# Patient Record
Sex: FEMALE | Race: WHITE
[De-identification: names, ages, dates, MRNs, and addresses within clinical notes are randomized per-mention and may not be internally consistent; named-entity substitution may affect disease eponyms.]

---

## 2017-12-12 ENCOUNTER — HOSPITAL ENCOUNTER (INPATIENT)
Dept: HOSPITAL 11 - JP.SDS | Age: 32
LOS: 3 days | Discharge: HOME | DRG: 621 | End: 2017-12-15
Attending: SURGERY | Admitting: SURGERY
Payer: COMMERCIAL

## 2017-12-12 DIAGNOSIS — F32.9: ICD-10-CM

## 2017-12-12 DIAGNOSIS — E03.9: ICD-10-CM

## 2017-12-12 DIAGNOSIS — K31.89: ICD-10-CM

## 2017-12-12 DIAGNOSIS — E11.9: ICD-10-CM

## 2017-12-12 DIAGNOSIS — F34.1: ICD-10-CM

## 2017-12-12 DIAGNOSIS — R16.0: ICD-10-CM

## 2017-12-12 DIAGNOSIS — R13.10: ICD-10-CM

## 2017-12-12 DIAGNOSIS — E66.01: Primary | ICD-10-CM

## 2017-12-12 DIAGNOSIS — K92.89: ICD-10-CM

## 2017-12-12 DIAGNOSIS — F43.10: ICD-10-CM

## 2017-12-12 DIAGNOSIS — G89.29: ICD-10-CM

## 2017-12-12 DIAGNOSIS — J45.20: ICD-10-CM

## 2017-12-12 DIAGNOSIS — Z88.8: ICD-10-CM

## 2017-12-12 DIAGNOSIS — Z88.0: ICD-10-CM

## 2017-12-12 DIAGNOSIS — Z86.14: ICD-10-CM

## 2017-12-12 DIAGNOSIS — M54.2: ICD-10-CM

## 2017-12-12 DIAGNOSIS — Z79.891: ICD-10-CM

## 2017-12-12 DIAGNOSIS — I10: ICD-10-CM

## 2017-12-12 DIAGNOSIS — Z88.2: ICD-10-CM

## 2017-12-12 PROCEDURE — C9113 INJ PANTOPRAZOLE SODIUM, VIA: HCPCS

## 2017-12-12 PROCEDURE — 0DBU4ZX EXCISION OF OMENTUM, PERCUTANEOUS ENDOSCOPIC APPROACH, DIAGNOSTIC: ICD-10-PCS | Performed by: SURGERY

## 2017-12-12 PROCEDURE — 0DB64ZX EXCISION OF STOMACH, PERCUTANEOUS ENDOSCOPIC APPROACH, DIAGNOSTIC: ICD-10-PCS | Performed by: SURGERY

## 2017-12-12 PROCEDURE — 0D164ZA BYPASS STOMACH TO JEJUNUM, PERCUTANEOUS ENDOSCOPIC APPROACH: ICD-10-PCS | Performed by: SURGERY

## 2017-12-12 PROCEDURE — 0FB04ZX EXCISION OF LIVER, PERCUTANEOUS ENDOSCOPIC APPROACH, DIAGNOSTIC: ICD-10-PCS | Performed by: SURGERY

## 2017-12-12 PROCEDURE — 3E0T3BZ INTRODUCTION OF ANESTHETIC AGENT INTO PERIPHERAL NERVES AND PLEXI, PERCUTANEOUS APPROACH: ICD-10-PCS | Performed by: SURGERY

## 2017-12-12 RX ADMIN — SODIUM CHLORIDE, SODIUM LACTATE, POTASSIUM CHLORIDE, AND CALCIUM CHLORIDE SCH MLS/HR: .6; .31; .03; .02 INJECTION, SOLUTION INTRAVENOUS at 16:07

## 2017-12-12 RX ADMIN — ACETAMINOPHEN SCH MG: 650 SOLUTION ORAL at 14:47

## 2017-12-12 RX ADMIN — LORAZEPAM PRN MG: 2 INJECTION INTRAMUSCULAR; INTRAVENOUS at 22:14

## 2017-12-12 RX ADMIN — ACETAMINOPHEN SCH: 650 SOLUTION ORAL at 21:10

## 2017-12-13 RX ADMIN — LORAZEPAM PRN MG: 2 INJECTION INTRAMUSCULAR; INTRAVENOUS at 02:25

## 2017-12-13 RX ADMIN — LORAZEPAM PRN MG: 2 INJECTION INTRAMUSCULAR; INTRAVENOUS at 19:47

## 2017-12-13 RX ADMIN — ACETAMINOPHEN PRN MLS/HR: 10 INJECTION, SOLUTION INTRAVENOUS at 10:40

## 2017-12-13 RX ADMIN — LORAZEPAM PRN MG: 2 INJECTION INTRAMUSCULAR; INTRAVENOUS at 22:25

## 2017-12-13 RX ADMIN — LORAZEPAM PRN MG: 2 INJECTION INTRAMUSCULAR; INTRAVENOUS at 04:45

## 2017-12-13 RX ADMIN — SODIUM CHLORIDE, SODIUM LACTATE, POTASSIUM CHLORIDE, AND CALCIUM CHLORIDE SCH MLS/HR: .6; .31; .03; .02 INJECTION, SOLUTION INTRAVENOUS at 15:45

## 2017-12-13 RX ADMIN — Medication SCH: at 10:41

## 2017-12-13 RX ADMIN — ACETAMINOPHEN SCH: 650 SOLUTION ORAL at 14:44

## 2017-12-13 RX ADMIN — ACETAMINOPHEN SCH MG: 650 SOLUTION ORAL at 02:21

## 2017-12-13 RX ADMIN — ACETAMINOPHEN PRN MLS/HR: 10 INJECTION, SOLUTION INTRAVENOUS at 19:57

## 2017-12-13 NOTE — PN
DATE OF SERVICE:  12/13/2017

 

SUBJECTIVE:  Codi has been having some difficulty swallowing.  Her upper GI did show that

she was tight but it is emptying.  Her lidocaine was discontinued during the night.

 

REVIEW OF SYSTEMS:  Remainder of review of systems negative for any pertinent positives and

negatives.

 

OBJECTIVE:  GENERAL:  Codi Piña is a 32-year-old female.

VITAL SIGNS:  TPR is 98.9, 94, 18, blood pressure 140/89.

HEENT:  Negative.

NECK:  Supple.

HEART:  Regular rate and rhythm.

LUNGS:  Clear.

ABDOMEN:  Dressings dry and intact.  Abdominal binder is on.  YAMEL drain has put out 85 mL of

a light pink drainage.

EXTREMITIES:  Without peripheral edema.

 

ASSESSMENT:  Laparoscopic Killian-en-Y gastric bypass surgery.

 

PLAN:

1. Sips of clear liquids.

2. Decrease IV to 100 mL per hour.

3. Change acetaminophen to 1000 mg IV q.6 hours today.

4. Start albuterol inhaler 2 puffs q.4 hours p.r.n. shortness of breath.

5. Albuterol ipratropium 3 mL inhaled as directed every 6 hours p.r.n. as she takes at

    home.

6. Flexeril 10 mg t.i.d. p.r.n. muscle spasms.

7. Lexapro 20 mg p.o. daily.

8. Hydrochlorothiazide 25 mg p.o. daily.

9. Discontinue Celebrex today and give Toradol 60 mg IM 1 time.  She is unable to swallow

    pills.  The other pills they are nursing staff, either open or crush.

10.Good pulmonary toilet.

11.We will evaluate p.r.n. or in a.m.

 

 

 

 

Adela Mack PA-C

DD:  12/13/2017 08:07:12

DT:  12/13/2017 08:35:19

Job #:  [id number]

## 2017-12-13 NOTE — CR
UGI wo KUB

 

HISTORY: eval R -Y GBP 

 

FINDINGS: After administration of oral contrast, upright views were obtained. Post operative changes 
gastric bypass. Surgical drains in place. No evidence for leak. Contrast passes freely into proximal 
small bowel loops.

 

IMPRESSION: No evidence for leak or obstruction.

## 2017-12-14 RX ADMIN — Medication SCH: at 09:32

## 2017-12-14 RX ADMIN — LORAZEPAM PRN MG: 2 INJECTION INTRAMUSCULAR; INTRAVENOUS at 03:53

## 2017-12-14 RX ADMIN — LORAZEPAM PRN MG: 2 INJECTION INTRAMUSCULAR; INTRAVENOUS at 22:16

## 2017-12-14 RX ADMIN — ACETAMINOPHEN PRN MLS/HR: 10 INJECTION, SOLUTION INTRAVENOUS at 03:55

## 2017-12-14 NOTE — PN
DATE OF SERVICE:  12/14/2017

 

SUBJECTIVE:  Codi is postop day #2.  She is able to swallow a little bit better today.

Within the past 24 hours, she did have 540 in.  She feels like it is starting to open up a

little bit.

 

REVIEW OF SYSTEMS:  Remainder of review of systems negative for any pertinent positives and

negatives.

 

OBJECTIVE:  GENERAL:  Codi Piña is a 32-year-old female.  She is lying in bed.  She is

more talkative today, feeling better.

VITAL SIGNS:  TPR is 98.6, 70, 17, blood pressure 153/96.  HEENT:  Negative.

NECK:  Supple.

HEART:  Regular rate and rhythm.

LUNGS:  Clear.

ABDOMEN:  Dressings dry and intact.  Abdominal binder is on.  YAMEL drain intact, draining a

pink serosanguineous drainage.

EXTREMITIES:  Without peripheral edema.

 

ASSESSMENT:  Laparoscopic Killian-en-Y gastric bypass surgery.

 

PLAN:

1. Step-1 diet start gradually.

2. Rx Solu-Medrol 80 mg IV q.12 hours today, give 3 med cups to try to drink 1 q.20

    minutes or 3 per hour.

3. Dressing off, may shower.

4. We will evaluate p.r.n. or in a.m.

 

 

 

 

Adela Mack PA-C

DD:  12/14/2017 08:10:03

DT:  12/14/2017 08:20:18

Job #:  [id number]

## 2017-12-18 NOTE — DISCH
ADMISSION DIAGNOSES:

1. Morbid obesity with body mass index of 40.6.

2. Post-traumatic stress disorder.

3. Hypothyroidism.

4. Mild intermittent asthma, "treatment agreement.".

5. Chronic pain medication.

6. Chronic neck pain.

7. Recurrent major depression disorder.

8. Dysthymia.

 

DISCHARGE DIAGNOSIS:  Laparoscopic Killian-en-Y gastric bypass surgery.

 

HISTORY:  Codi Piña is a 32-year-old female with longstanding history of morbid obesity

and increasing comorbidities.  After preoperative evaluation and discussion of possible

risks and benefits, she wished to proceed with surgical procedure.

 

HOSPITAL COURSE:  Codi had her surgery on 12/12/2017.  She had no operative complications.

On postoperative day #1, she had some dysphagia, with difficulty swallowing.  On 12/14/2017,

she was started on Solu-Medrol 80 mg IV, and after the first dose, she was able to start on

a step-1 gastric bypass diet, and her oral intake was 1280.  She was up ambulating.  Vital

signs were stable.  She has received adequate dietary instruction, and she was ready to be

discharged to home on postoperative day #3, on 12/15/2017.

 

PHYSICAL EXAMINATION:

GENERAL:  Codi Piña is a 32-year-old female.

VITAL SIGNS:  Height is 5 feet 7 inches and weight is 259 pounds.  TPR with temperature of

97.9, pulse of 103, respirations of 16; and blood pressure of 142/83.

HEENT:  Negative.

NECK:  Supple.

HEART:  Regular rate and rhythm.

LUNGS:  Clear.

ABDOMEN:  Sutures look good.  4x4's over YMAEL drain site.  Abdominal binder is on.

EXTREMITIES:  Without peripheral edema.

 

DISPOSITION:  Discharged to home.

 

CONDITION:  Stable and improving.

 

FOLLOWUP APPOINTMENT:  On 12/19/2017 at 9:15 a.m. at [place], [place].

 

HOME MEDICATIONS:

1. Zofran ODT 4 mg every four hours p.r.n. nausea.

2. Medrol 4 mg Dosepak, take as directed.

3. She is to resume her home medication of Celebrex 200 mg daily for 14 days.

4. Alprazolam 0.5 mg oral daily p.r.n. anxiety.

5. ProAir inhaler two puffs inhalation every six hours p.r.n. shortness of breath.

6. Flexeril 10 mg three times a day p.r.n. muscle spasms.

7. Lexapro 20 mg oral daily.

8. Hydrochlorothiazide 25 mg oral daily.

9. Hydrocodone/acetaminophen 10/325 one tablet every 8 hours p.r.n. pain.

10.Synthroid 100 mcg oral daily.

11.Depo-Provera continue as scheduled.

12.Discontinue taking the meloxicam.

 

DIET AFTER DISCHARGE:  Step-1 gastric bypass diet with protein shakes only.  Drink 64 ounces

of water daily.

 

ACTIVITY:  No lifting greater than 10 pounds for two weeks.  Walk at least six times daily

inside your house.  Driving, do not drive for one week or while on pain medication.

Shower/bathing, she may shower.

 

WOUND CARE:  Keep operative site clean and dry.  Wound incision care, wear abdominal binder

for two weeks, and then as tolerated.

 

DISCHARGE INSTRUCTIONS:  Notify provider if any fever, increased pain, drainage, nausea,

vomiting, and use incentive spirometer 10 times every hour while awake for two weeks.

## 2017-12-18 NOTE — OR
DATE OF PROCEDURE:  12/12/2017

 

PREOPERATIVE DIAGNOSIS:  Morbid obesity.

 

POSTOPERATIVE DIAGNOSES:

1. Morbid obesity.

2. Marked hepatomegaly.

3. Peritoneal nodules over surface of omentum and lesser curvature of stomach.

 

OPERATIVE PROCEDURE:

1. Laparoscopic Killian-en-Y gastric bypass along with gastroenterostomy (14413).

2. Christopher-Cut needle liver biopsy (77498).

3. Excision of peritoneal nodule overlying omentum (13996).

4. Excision of a peritoneal nodule over lesser curvature of the stomach (47085).

 

ANESTHESIA:  General.

 

INDICATIONS FOR PROCEDURE:  A 32-year-old female presenting with longstanding morbid obesity

and increasingly significant comorbidities.  After preoperative evaluation and discussion,

she wished to proceed with a gastric bypass procedure.  Potential risks of the procedure

including bleeding, infection, leaks from the various GI tract closures, problems with bowel

obstruction over time, as well as possibility of cardiopulmonary, septic, or hemorrhagic

complications leading to death, were all discussed, and the patient wishes to proceed.

 

DETAILS OF PROCEDURE:  The patient was taken to the operating room and placed in a supine

position.  After general endotracheal anesthesia was induced, she was converted to a

lithotomy position and orogastric tube, and the abdomen was then prepped and draped.

 

At 15 cm inferior, 5 cm left of xiphoid process, a transverse incision was made and

peritoneal cavity entered under direct vision with an Optiview trocar.  The peritoneal

cavity was inflated to 15 mmHg pressure with CO2 and laparoscope reinserted.  No underlying

trocar insertion site injuries were seen.

 

At this point, bilateral subcostal transversus abdominis plane blocks were placed using

standard solution.  The location of the needle was visually confirmed to be just underneath

the peritoneum within the transversus abdominis muscle.

 

Following this, 5 additional trocars were placed across the upper mid abdomen, and general

exploration was undertaken.  The patient was noted to have a marked hepatomegaly with the

liver volume being roughly 2 or 3 times normal.  Liver grossly fatty infiltrated.

 

During the course of dissection, 2 reddish peritoneal nodules were identified.  These were

uncertain in nature.  One was a nodule over the midportion of the omentum, which measured

around 4 mm.  This was excised using Harmonic scalpel.  A second nodule was then noted

subsequently on the lesser curvature of the stomach, more or less flush with the attachment

with the lesser omentum, which measured around 8 mm in size.  This likewise was excised.

 

At this point, the omentum was divided in the midline up to the level of the transverse

colon.  This allowed identification of the small bowel to the ligament of Treitz.  The small

bowel was then traced out 200 cm distal to that point, was divided transversely with a APURVA

stapler.  The small bowel was then traced out additional 200 cm, where a side-to-side

enteroenterostomy was accomplished with an internal firing of the Endo-APURVA 60 mm stapler.

The common opening was then closed transversely with the same stapler, the angles

anastomosed, and mesenteric defect approximated with some 0 Ethibond stitch, along with

fibrin sealant.  The divided end of the Killian limb was  from the mesentery for a few

centimeters, which allowed an antecolic position of the Killian limb up to the level of the

gastroesophageal junction without tension.

 

The liver was then retracted anteriorly.  The patient was noted not to have any significant

hiatal hernia.  The gastrointestinal balloon catheter was inflated to 15 mL and pulled up

snugly against the EG junction.  Gastric wall over the apex balloon was marked with

electrocautery, and the balloon catheter was deflated and pulled up from the esophagus.  The

lesser omental tissue adjacent to gastric cardia was then incised, allowing dissection

behind the stomach at that level.  The pouch formation was initiated with a transverse

firing of the APURVA stapler at the level of the cauterized jamal on the gastric cardia.  The

pouch was then completed with 2 additional firings of the APURVA stapler up to and through the

angle of His.  Upon completion of the pouch, both staple lines were noted to be intact.

 

The anvil of a 25 mm EEA stapler was attached to a [place] sump type tube.  The latter was

brought down through the mouth and taken out through a small opening in the gastric pouch,

allowing the anvil likewise to be pulled down to within the gastric pouch.  The divided end

of the Killian limb was then opened and main body of the EEA stapler was passed several

centimeters in the lumen of the small bowel, brought up the anvil and united with it, thus

creating the gastrojejunostomy.  The anastomosis was then reinforced with some 3-0 Vicryl

seromuscular stitch, along with fibrin sealant.  A leak test was accomplished with injection

of 120 mL of air in the gastric pouch while submerged with cefoxitin-containing saline

solution.  No leaks were identified.  A single Kostas-Dennison drain was taken out through the

left subcostal trocar site and placed adjacent to the gastrojejunostomy and from there up

into the splenic fossa.  At that point, no further problems noted, trocars were removed, and

the peritoneal cavity was deflated.  The incisions were closed with some 4-0 Vicryl skin

stitch, which was also used to affix the drain.  Dressings were applied.  The patient was

taken to the recovery room in satisfactory condition.  There were no evident complications.

 

 

 

 

Suresh Mata MD

DD:  12/16/2017 18:28:21

DT:  12/17/2017 11:24:04

Job #:  [id number]

## 2018-02-08 ENCOUNTER — HOSPITAL ENCOUNTER (OUTPATIENT)
Dept: HOSPITAL 11 - JP.SDS | Age: 33
Discharge: HOME | End: 2018-02-08
Attending: SURGERY
Payer: COMMERCIAL

## 2018-02-08 DIAGNOSIS — K95.89: Primary | ICD-10-CM

## 2018-02-08 DIAGNOSIS — Z88.8: ICD-10-CM

## 2018-02-08 DIAGNOSIS — Z88.0: ICD-10-CM

## 2018-02-08 DIAGNOSIS — Z88.2: ICD-10-CM

## 2018-02-12 NOTE — OR
DATE OF PROCEDURE:  02/08/2018

 

PREOPERATIVE DIAGNOSIS:  Probable stricture at gastrojejunostomy.

 

POSTOPERATIVE DIAGNOSIS:  Stricture at gastrojejunostomy.

 

OPERATIVE PROCEDURE:  Upper GI endoscopy with dilation of gastrojejunostomy (99880).

 

ANESTHESIA:  IV sedation.

 

INDICATION FOR PROCEDURE:  This is a 32-year-old female status post Killian-en-Y gastric bypass

on 02/12/2017 and she presents now with symptoms suggestive of stricturing at her

gastrojejunostomy.  Plan is to proceed with an upper GI endoscopy with dilation as

indicated.  Potential risks including bleeding and perforation were discussed, and the

patient wishes to proceed.

 

DETAILS OF PROCEDURE:  The patient was taken to the operating room and placed in a left

lateral decubitus position.  IV sedation was administered, after which the upper GI

endoscope was passed orally through the length of the esophagus and into the gastric pouch.

No retained food or fluid was noted.  The patient was noted to have a moderate stricture at

the gastrojejunostomy with estimated diameter of around 8 mm.  A Bard gastrointestinal

balloon catheter was centered across the anastomosis and inflated to 36-Nigerian size.  This

was held in position for 1 minute, after which the balloon catheter was deflated and

withdrawn.  The scope was then easily passed through the anastomosis.  No complications

noted.  The procedure concluded.  The patient was taken to the recovery room in satisfactory

condition.

 

 

 

 

Suresh Mata MD

DD:  02/11/2018 19:34:08

DT:  02/12/2018 17:48:41

Job #:  [id number]

## 2018-03-02 ENCOUNTER — HOSPITAL ENCOUNTER (OUTPATIENT)
Dept: HOSPITAL 11 - JP.SDS | Age: 33
Discharge: HOME | End: 2018-03-02
Attending: SURGERY
Payer: COMMERCIAL

## 2018-03-02 DIAGNOSIS — Z88.0: ICD-10-CM

## 2018-03-02 DIAGNOSIS — R11.2: Primary | ICD-10-CM

## 2018-03-02 DIAGNOSIS — Z88.2: ICD-10-CM

## 2018-03-02 DIAGNOSIS — Z88.8: ICD-10-CM

## 2018-03-02 DIAGNOSIS — Z98.84: ICD-10-CM

## 2018-03-02 DIAGNOSIS — J45.909: ICD-10-CM

## 2018-03-02 DIAGNOSIS — E03.9: ICD-10-CM

## 2018-03-02 PROCEDURE — 43235 EGD DIAGNOSTIC BRUSH WASH: CPT

## 2018-03-02 NOTE — OR
DATE OF PROCEDURE:  03/02/2018

 

PREOPERATIVE DIAGNOSIS:  Nausea and vomiting, status post Killian-en-Y gastric bypass on

December 12, 2017; possible strictured gastrojejunostomy.

 

POSTOPERATIVE DIAGNOSIS:  Nausea and vomiting, status post Killian-en-Y gastric bypass on

December 12, 2017; possible strictured gastrojejunostomy.  No evidence of strictured

gastrojejunostomy.

 

PROCEDURE:  Esophagogastrojejunostomy.

 

ANESTHESIA:  IV anesthesia with monitored anesthesia care.

 

INDICATION:  This 32-year-old white female underwent a Killian-en-Y gastric bypass on December 12, 2017 for morbid obesity.  Almost a month ago, she had to undergo a gastric dilatation

for a stricture at her gastrojejunostomy.  This was manifested by nausea and vomiting.  She

now notes recurrence of the nausea and vomiting, and is referred for a repeat upper

endoscopy with dilatation.  I counseled her for this, and she gave her informed consent to

proceed.

 

DESCRIPTION OF PROCEDURE:  The patient was placed in the left lateral decubitus position.

IV anesthesia was administered by the Anesthesia Service.  Time-out was held.  The flexible

video Olympus upper endoscope was passed through her mouth, down her esophagus, and into her

gastric remnant.  The scope was easily passed through the anastomosis into the jejunum.

There was no evidence of stricture.  It appeared widely patent.  The scope was then

withdrawn examining the mucosa throughout.  The mucosa throughout appeared unremarkable.

The scope was then removed.  She tolerated the procedure well.

 

 

 

 

Lei Lopez MD

DD:  03/02/2018 08:38:21

DT:  03/02/2018 09:14:37

Job #:  [id number]

## 2018-03-07 ENCOUNTER — HOSPITAL ENCOUNTER (OUTPATIENT)
Dept: HOSPITAL 11 - JP.SDS | Age: 33
Discharge: HOME | End: 2018-03-07
Attending: SURGERY
Payer: COMMERCIAL

## 2018-03-07 DIAGNOSIS — Z88.8: ICD-10-CM

## 2018-03-07 DIAGNOSIS — Z88.0: ICD-10-CM

## 2018-03-07 DIAGNOSIS — J45.909: ICD-10-CM

## 2018-03-07 DIAGNOSIS — E66.9: ICD-10-CM

## 2018-03-07 DIAGNOSIS — E03.9: ICD-10-CM

## 2018-03-07 DIAGNOSIS — K95.89: Primary | ICD-10-CM

## 2018-03-07 DIAGNOSIS — Z88.2: ICD-10-CM

## 2018-03-07 PROCEDURE — 43233 EGD BALLOON DIL ESOPH30 MM/>: CPT

## 2018-03-07 NOTE — OR
DATE OF PROCEDURE:  03/07/2018

 

PREOPERATIVE DIAGNOSIS:  Nausea status post Killian-en-Y gastric bypass.

 

POSTOPERATIVE DIAGNOSIS:  Nausea status post Killian-en-Y gastric bypass with a mild stricture

of gastrojejunostomy.

 

OPERATIVE PROCEDURE:  Upper GI endoscopy with dilation of gastrojejunostomy.

 

ANESTHESIA:  IV sedation.

 

INDICATION FOR PROCEDURE:  This is a 32-year-old status post Killian-en-Y gastric bypass in

December.  She presents now with some nausea, and the plan is to proceed with upper GI

endoscopy with dilation as indicated.  Potential risks including bleeding and perforation

were discussed, and the patient wishes to proceed.

 

DETAILS OF PROCEDURE:  The patient was taken to the operating room and placed in a left

lateral decubitus position.  IV sedation was administered, after which the upper GI

endoscope was passed orally through the length of the esophagus, into the stomach pouch, and

from there through the gastrojejunostomy roughly 20 cm into the Killian limb.

 

The patient did have a very mild stricture of the gastrojejunostomy with a 1-cm scope being

able to be gently passed through that area.  There otherwise were no significant

inflammation noted.  Bard gastrointestinal balloon catheter was then centered across the

anastomosis and inflated to 45-[place] size which did produce some additional diameter of the

gastrojejunostomy.  No complications were noted.  The scope was then withdrawn.  The

procedure then concluded.

 

At this point, we will begin the patient on some Levsin 0.125 mg sublingual q.i.d. x5 days

and then q.i.d. p.r.n., #50, refill x2; and then Zofran 4 mg ODT q.4 hours p.r.n. nausea,

#25, refill x3.  We will set her up to follow up with Adela Mack at [facility] in [place] on 03/20/2018.

 

 

 

 

Suresh Mata MD

DD:  03/07/2018 08:04:24

DT:  03/07/2018 12:52:44

Job #:  [id number]

## 2018-06-24 ENCOUNTER — HOSPITAL ENCOUNTER (EMERGENCY)
Dept: HOSPITAL 11 - JP.ED | Age: 33
Discharge: HOME | End: 2018-06-24
Payer: COMMERCIAL

## 2018-06-24 DIAGNOSIS — Z88.0: ICD-10-CM

## 2018-06-24 DIAGNOSIS — I10: ICD-10-CM

## 2018-06-24 DIAGNOSIS — Z88.2: ICD-10-CM

## 2018-06-24 DIAGNOSIS — R10.13: Primary | ICD-10-CM

## 2018-06-24 DIAGNOSIS — Z98.84: ICD-10-CM

## 2018-06-24 DIAGNOSIS — J45.909: ICD-10-CM

## 2018-06-24 DIAGNOSIS — Z79.899: ICD-10-CM

## 2018-06-24 DIAGNOSIS — Z87.891: ICD-10-CM

## 2018-06-24 DIAGNOSIS — Z88.8: ICD-10-CM

## 2018-06-24 DIAGNOSIS — E03.9: ICD-10-CM

## 2018-06-24 PROCEDURE — 84484 ASSAY OF TROPONIN QUANT: CPT

## 2018-06-24 PROCEDURE — 80053 COMPREHEN METABOLIC PANEL: CPT

## 2018-06-24 PROCEDURE — 99285 EMERGENCY DEPT VISIT HI MDM: CPT

## 2018-06-24 PROCEDURE — 85025 COMPLETE CBC W/AUTO DIFF WBC: CPT

## 2018-06-24 PROCEDURE — 83690 ASSAY OF LIPASE: CPT

## 2018-06-24 PROCEDURE — 74177 CT ABD & PELVIS W/CONTRAST: CPT

## 2018-06-24 PROCEDURE — 81001 URINALYSIS AUTO W/SCOPE: CPT

## 2018-06-24 PROCEDURE — 96360 HYDRATION IV INFUSION INIT: CPT

## 2018-06-24 PROCEDURE — 36415 COLL VENOUS BLD VENIPUNCTURE: CPT

## 2018-06-24 PROCEDURE — 83605 ASSAY OF LACTIC ACID: CPT

## 2018-06-24 PROCEDURE — 96361 HYDRATE IV INFUSION ADD-ON: CPT
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
## 2018-06-27 ENCOUNTER — HOSPITAL ENCOUNTER (EMERGENCY)
Facility: CLINIC | Age: 33
Discharge: HOME | End: 2018-06-27
Attending: EMERGENCY MEDICINE | Admitting: EMERGENCY MEDICINE
Payer: COMMERCIAL

## 2018-06-27 VITALS
OXYGEN SATURATION: 98 % | HEART RATE: 90 BPM | RESPIRATION RATE: 16 BRPM | SYSTOLIC BLOOD PRESSURE: 140 MMHG | HEIGHT: 67 IN | TEMPERATURE: 98 F | BODY MASS INDEX: 29.93 KG/M2 | WEIGHT: 190.7 LBS | DIASTOLIC BLOOD PRESSURE: 92 MMHG

## 2018-06-27 DIAGNOSIS — R10.13 ABDOMINAL PAIN, EPIGASTRIC: ICD-10-CM

## 2018-06-27 LAB
ALBUMIN SERPL-MCNC: 3.9 G/DL (ref 3.4–5)
ALP SERPL-CCNC: 82 U/L (ref 40–150)
ALT SERPL W P-5'-P-CCNC: 19 U/L (ref 0–50)
ANION GAP SERPL CALCULATED.3IONS-SCNC: 11 MMOL/L (ref 3–14)
AST SERPL W P-5'-P-CCNC: 17 U/L (ref 0–45)
B-HCG SERPL-ACNC: <1 IU/L (ref 0–5)
BASOPHILS # BLD AUTO: 0 10E9/L (ref 0–0.2)
BASOPHILS NFR BLD AUTO: 0.3 %
BILIRUB SERPL-MCNC: 0.3 MG/DL (ref 0.2–1.3)
BUN SERPL-MCNC: 13 MG/DL (ref 7–30)
CALCIUM SERPL-MCNC: 9 MG/DL (ref 8.5–10.1)
CHLORIDE SERPL-SCNC: 106 MMOL/L (ref 94–109)
CO2 SERPL-SCNC: 26 MMOL/L (ref 20–32)
CREAT SERPL-MCNC: 0.5 MG/DL (ref 0.52–1.04)
CRP SERPL-MCNC: <2.9 MG/L (ref 0–8)
DIFFERENTIAL METHOD BLD: NORMAL
EOSINOPHIL # BLD AUTO: 0.1 10E9/L (ref 0–0.7)
EOSINOPHIL NFR BLD AUTO: 1.3 %
ERYTHROCYTE [DISTWIDTH] IN BLOOD BY AUTOMATED COUNT: 13.7 % (ref 10–15)
GFR SERPL CREATININE-BSD FRML MDRD: >90 ML/MIN/1.7M2
GLUCOSE SERPL-MCNC: 87 MG/DL (ref 70–99)
HCT VFR BLD AUTO: 41.1 % (ref 35–47)
HGB BLD-MCNC: 14 G/DL (ref 11.7–15.7)
IMM GRANULOCYTES # BLD: 0 10E9/L (ref 0–0.4)
IMM GRANULOCYTES NFR BLD: 0.2 %
LIPASE SERPL-CCNC: 88 U/L (ref 73–393)
LYMPHOCYTES # BLD AUTO: 3.9 10E9/L (ref 0.8–5.3)
LYMPHOCYTES NFR BLD AUTO: 39 %
MCH RBC QN AUTO: 28 PG (ref 26.5–33)
MCHC RBC AUTO-ENTMCNC: 34.1 G/DL (ref 31.5–36.5)
MCV RBC AUTO: 82 FL (ref 78–100)
MONOCYTES # BLD AUTO: 0.4 10E9/L (ref 0–1.3)
MONOCYTES NFR BLD AUTO: 3.9 %
NEUTROPHILS # BLD AUTO: 5.6 10E9/L (ref 1.6–8.3)
NEUTROPHILS NFR BLD AUTO: 55.3 %
NRBC # BLD AUTO: 0 10*3/UL
NRBC BLD AUTO-RTO: 0 /100
PLATELET # BLD AUTO: 303 10E9/L (ref 150–450)
POTASSIUM SERPL-SCNC: 3.4 MMOL/L (ref 3.4–5.3)
PROT SERPL-MCNC: 7.6 G/DL (ref 6.8–8.8)
RBC # BLD AUTO: 5 10E12/L (ref 3.8–5.2)
SODIUM SERPL-SCNC: 142 MMOL/L (ref 133–144)
TROPONIN I SERPL-MCNC: <0.015 UG/L (ref 0–0.04)
WBC # BLD AUTO: 10 10E9/L (ref 4–11)

## 2018-06-27 PROCEDURE — 93005 ELECTROCARDIOGRAM TRACING: CPT | Performed by: EMERGENCY MEDICINE

## 2018-06-27 PROCEDURE — 86140 C-REACTIVE PROTEIN: CPT | Performed by: EMERGENCY MEDICINE

## 2018-06-27 PROCEDURE — 85025 COMPLETE CBC W/AUTO DIFF WBC: CPT | Performed by: EMERGENCY MEDICINE

## 2018-06-27 PROCEDURE — 84702 CHORIONIC GONADOTROPIN TEST: CPT | Performed by: EMERGENCY MEDICINE

## 2018-06-27 PROCEDURE — 84484 ASSAY OF TROPONIN QUANT: CPT | Performed by: EMERGENCY MEDICINE

## 2018-06-27 PROCEDURE — 99284 EMERGENCY DEPT VISIT MOD MDM: CPT | Mod: 25 | Performed by: EMERGENCY MEDICINE

## 2018-06-27 PROCEDURE — 93010 ELECTROCARDIOGRAM REPORT: CPT | Mod: Z6 | Performed by: EMERGENCY MEDICINE

## 2018-06-27 PROCEDURE — 83690 ASSAY OF LIPASE: CPT | Performed by: EMERGENCY MEDICINE

## 2018-06-27 PROCEDURE — 80053 COMPREHEN METABOLIC PANEL: CPT | Performed by: EMERGENCY MEDICINE

## 2018-06-27 RX ORDER — HYDROCODONE BITARTRATE AND ACETAMINOPHEN 10; 325 MG/1; MG/1
1 TABLET ORAL EVERY 4 HOURS PRN
COMMUNITY

## 2018-06-27 RX ORDER — ESCITALOPRAM OXALATE 20 MG/1
20 TABLET ORAL DAILY
COMMUNITY

## 2018-06-27 RX ORDER — IPRATROPIUM BROMIDE AND ALBUTEROL SULFATE 2.5; .5 MG/3ML; MG/3ML
1 SOLUTION RESPIRATORY (INHALATION) EVERY 6 HOURS PRN
COMMUNITY

## 2018-06-27 RX ORDER — ALBUTEROL SULFATE 90 UG/1
2 AEROSOL, METERED RESPIRATORY (INHALATION) EVERY 6 HOURS
COMMUNITY

## 2018-06-27 NOTE — ED TRIAGE NOTES
Pt is s/p Jin-en-y done in Dec 2017 presents today with c/o abdominal pain, bloating and excessive flatulence especially belching.

## 2018-06-27 NOTE — ED AVS SNAPSHOT
[facility], [place], Emergency Department    [address] [place] [address]    Phone:  [phone]                                       Adele Lopez   MRN: [id number]    Department:  [facility], Emergency Department   Date of Visit:  6/27/2018           After Visit Summary Signature Page     I have received my discharge instructions, and my questions have been answered. I have discussed any challenges I see with this plan with the nurse or doctor.    ..........................................................................................................................................  Patient/Patient Representative Signature      ..........................................................................................................................................  Patient Representative Print Name and Relationship to Patient    ..................................................               ................................................  Date                                            Time    ..........................................................................................................................................  Reviewed by Signature/Title    ...................................................              ..............................................  Date                                                            Time

## 2018-06-27 NOTE — ED AVS SNAPSHOT
[facility], Emergency Department    [address] [facility] [address]    Phone:  [phone]                                       Adele Lopez   MRN: [id number]    Department:  [facility], Emergency Department   Date of Visit:  6/27/2018           Patient Information     Date Of Birth          1985        Your diagnoses for this visit were:     None       You were seen by Jakub Sams MD.        Discharge Instructions       TODAY'S VISIT:  You were seen today for abdominal pain, unclear etiology.   -     FOLLOW-UP:  Please make an appointment to follow up with:  - GI Clinic (phone: [phone]) and Surgery (Bariatric) (phone: [phone]) as soon as possible for evaluation.    PRESCRIPTIONS / MEDICATIONS:  -    OTHER INSTRUCTIONS:  -If you decide you want repeat imaging please return to the emergency department to finish her ER evaluation.    RETURN TO THE EMERGENCY DEPARTMENT  Return to the Emergency Department at any time for new/worsening symptoms.       24 Hour Appointment Hotline       To make an appointment at any [facility] clinic, call [phone] ([phone]). If you don't have a family doctor or clinic, we will help you find one. [facility] clinics are conveniently located to serve the needs of you and your family.             Review of your medicines      Our records show that you are taking the medicines listed below. If these are incorrect, please call your family doctor or clinic.        Dose / Directions Last dose taken    albuterol 108 (90 Base) MCG/ACT Inhaler   Commonly known as:  PROAIR HFA/PROVENTIL HFA/VENTOLIN HFA   Dose:  2 puff        Inhale 2 puffs into the lungs every 6 hours   Refills:  0        CYCLOBENZAPRINE HCL PO   Dose:  10 mg        Take 10 mg by mouth   Refills:  0        HYDROcodone-acetaminophen  MG per tablet   Commonly known as:  NORCO   Dose:  1 tablet        Take 1 tablet by mouth every 4 hours as needed for severe pain   Refills:   0        ipratropium - albuterol 0.5 mg/2.5 mg/3 mL 0.5-2.5 (3) MG/3ML neb solution   Commonly known as:  DUONEB   Dose:  1 vial        Take 1 vial by nebulization every 6 hours as needed for shortness of breath / dyspnea or wheezing   Refills:  0        LEVOTHYROXINE SODIUM PO        Take by mouth daily   Refills:  0        LEXAPRO 20 MG tablet   Dose:  20 mg   Generic drug:  escitalopram        Take 20 mg by mouth daily   Refills:  0        XANAX PO   Dose:  0.5 mg        Take 0.5 mg by mouth At Bedtime   Refills:  0                Information about OPIOIDS     PRESCRIPTION OPIOIDS: WHAT YOU NEED TO KNOW   We gave you an opioid (narcotic) pain medicine. It is important to manage your pain, but opioids are not always the best choice. You should first try all the other options your care team gave you. Take this medicine for as short a time (and as few doses) as possible.     These medicines have risks:    DO NOT drive when on new or higher doses of pain medicine. These medicines can affect your alertness and reaction times, and you could be arrested for driving under the influence (DUI). If you need to use opioids long-term, talk to your care team about driving.    DO NOT operate heave machinery    DO NOT do any other dangerous activities while taking these medicines.     DO NOT drink any alcohol while taking these medicines.      If the opioid prescribed includes acetaminophen, DO NOT take with any other medicines that contain acetaminophen. Read all labels carefully. Look for the word  acetaminophen  or  Tylenol.  Ask your pharmacist if you have questions or are unsure.    You can get addicted to pain medicines, especially if you have a history of addiction (chemical, alcohol or substance dependence). Talk to your care team about ways to reduce this risk.    Store your pills in a secure place, locked if possible. We will not replace any lost or stolen medicine. If you don t finish your medicine, please throw away  (dispose) as directed by your pharmacist. The [place] Pollution Control Agency has more information about safe disposal: https://www.pca.[facility].[place].us/living-green/managing-unwanted-medications.     All opioids tend to cause constipation. Drink plenty of water and eat foods that have a lot of fiber, such as fruits, vegetables, prune juice, apple juice and high-fiber cereal. Take a laxative (Miralax, milk of magnesia, Colace, Senna) if you don t move your bowels at least every other day.         Procedures and tests performed during your visit     CBC with platelets differential    CRP inflammation    Comprehensive metabolic panel    EKG 12-lead, tracing only    HCG quantitative pregnancy    Lipase    Peripheral IV catheter    Troponin I      Orders Needing Specimen Collection     Ordered          06/27/18 2205  UA with Microscopic - STAT, Prio: STAT, Needs to be Collected     Scheduled Task Status   06/27/18 2205 Collect UA with Microscopic Open   Order Class:  PCU Collect                06/27/18 2205  Urine Culture - ROUTINE, Prio: Routine, Needs to be Collected     Scheduled Task Status   06/27/18 2205 Collect Urine Culture Open   Order Class:  PCU Collect                  Pending Results     Date and Time Order Name Status Description    6/27/2018 2205 EKG 12-lead, tracing only Preliminary             Pending Culture Results     No orders found from 6/25/2018 to 6/28/2018.            Pending Results Instructions     If you had any lab results that were not finalized at the time of your Discharge, you can call the ED Lab Result RN at [phone]. You will be contacted by this team for any positive Lab results or changes in treatment. The nurses are available 7 days a week from 10A to 6:30P.  You can leave a message 24 hours per day and they will return your call.        Thank you for choosing [facility]       Thank you for choosing [facility] for your care. Our goal is always to provide you with excellent care.  "Hearing back from our patients is one way we can continue to improve our services. Please take a few minutes to complete the written survey that you may receive in the mail after you visit with us. Thank you!        [facility] Information     [employer] lets you send messages to your doctor, view your test results, renew your prescriptions, schedule appointments and more. To sign up, go to [email or web address] . Click on \"Log in\" on the left side of the screen, which will take you to the Welcome page. Then click on \"Sign up Now\" on the right side of the page.     You will be asked to enter the access code listed below, as well as some personal information. Please follow the directions to create your username and password.     Your access code is: [email or web address]  Expires: 2018 11:29 PM     Your access code will  in 90 days. If you need help or a new code, please call your [facility] clinic or [phone].        Care EveryWhere ID     This is your Care EveryWhere ID. This could be used by other organizations to access your [facility] medical records  [id number]        Equal Access to Services     BRISA MENA : [place] [facility], [facility], jeannie cabrera, geovanna jones . So [facility] [phone].    ATENCIÓN: Si habla español, tiene a burks disposición servicios gratuitos de asistencia lingüística. Angelo al [phone].    We comply with applicable federal civil rights laws and [place] laws. We do not discriminate on the basis of race, color, national origin, age, disability, sex, sexual orientation, or gender identity.            After Visit Summary       This is your record. Keep this with you and show to your community pharmacist(s) and doctor(s) at your next visit.                  "

## 2018-06-28 ENCOUNTER — HOSPITAL ENCOUNTER (INPATIENT)
Dept: HOSPITAL 11 - JP.2SS | Age: 33
LOS: 1 days | Discharge: HOME | DRG: 392 | End: 2018-06-29
Attending: SURGERY | Admitting: PHYSICIAN ASSISTANT
Payer: COMMERCIAL

## 2018-06-28 DIAGNOSIS — Z98.84: ICD-10-CM

## 2018-06-28 DIAGNOSIS — Z86.14: ICD-10-CM

## 2018-06-28 DIAGNOSIS — J45.20: ICD-10-CM

## 2018-06-28 DIAGNOSIS — E03.9: ICD-10-CM

## 2018-06-28 DIAGNOSIS — Z88.2: ICD-10-CM

## 2018-06-28 DIAGNOSIS — H54.7: ICD-10-CM

## 2018-06-28 DIAGNOSIS — Z79.899: ICD-10-CM

## 2018-06-28 DIAGNOSIS — M54.9: ICD-10-CM

## 2018-06-28 DIAGNOSIS — Z90.49: ICD-10-CM

## 2018-06-28 DIAGNOSIS — R14.2: ICD-10-CM

## 2018-06-28 DIAGNOSIS — E66.9: ICD-10-CM

## 2018-06-28 DIAGNOSIS — F43.10: ICD-10-CM

## 2018-06-28 DIAGNOSIS — M54.2: ICD-10-CM

## 2018-06-28 DIAGNOSIS — F41.9: ICD-10-CM

## 2018-06-28 DIAGNOSIS — R10.13: Primary | ICD-10-CM

## 2018-06-28 DIAGNOSIS — K91.2: ICD-10-CM

## 2018-06-28 DIAGNOSIS — E53.8: ICD-10-CM

## 2018-06-28 DIAGNOSIS — F33.9: ICD-10-CM

## 2018-06-28 DIAGNOSIS — Z88.0: ICD-10-CM

## 2018-06-28 DIAGNOSIS — G51.0: ICD-10-CM

## 2018-06-28 DIAGNOSIS — Z88.8: ICD-10-CM

## 2018-06-28 DIAGNOSIS — G89.29: ICD-10-CM

## 2018-06-28 DIAGNOSIS — Z87.891: ICD-10-CM

## 2018-06-28 LAB — INTERPRETATION ECG - MUSE: NORMAL

## 2018-06-28 RX ADMIN — HYDROCODONE BITARTRATE AND ACETAMINOPHEN PRN TAB: 10; 325 TABLET ORAL at 21:47

## 2018-06-28 ASSESSMENT — ENCOUNTER SYMPTOMS
COUGH: 0
CHEST TIGHTNESS: 1
FEVER: 0
ABDOMINAL PAIN: 1
DYSURIA: 0
DIFFICULTY URINATING: 0

## 2018-06-28 NOTE — DISCHARGE INSTRUCTIONS
TODAY'S VISIT:  You were seen today for abdominal pain, unclear etiology.   -     FOLLOW-UP:  Please make an appointment to follow up with:  - GI Clinic (phone: [phone]) and Surgery (Bariatric) (phone: [phone]) as soon as possible for evaluation.    PRESCRIPTIONS / MEDICATIONS:  -    OTHER INSTRUCTIONS:  -If you decide you want repeat imaging please return to the emergency department to finish her ER evaluation.    RETURN TO THE EMERGENCY DEPARTMENT  Return to the Emergency Department at any time for new/worsening symptoms.

## 2018-06-28 NOTE — ED PROVIDER NOTES
"  History     Chief Complaint   Patient presents with     Abdominal Pain     Gas     HPI  Adele Tejada is a 32 year old female  who is s/p gastric bypass 6 months ago who presents with epigastric pain and belching. Patient states that the belching and her abdominal discomfort has been chronic since the surgery at [facility] in [place]. She also has not had any satisfactory answer as to why she is having these symptoms despite multiple evaluations including EGDs and CT scans.  Patient states that she is frustrated with being told by providers,  \"I do not know\" as to what the cause of her symptoms are. However,  she states over the weekend she developed worsening epigastric pain. She states the pain does radiate to her chest and is made worse right after eating. She last ate at around 12 PM today. She has been taking Xanax for her belching and pain at night and states she cannot take ibuprofen. She otherwise currently denies any other symptoms including fever, diarrhea, urinary symptoms, concern for pregnancy. Denies other URI symptoms, leg swelling, or previous history of DVT/PE.     She also complains of chest tightness associated with severe bouts of epigastric pain and made worse with inspiration. When she was seen for these symptoms previously, she was told by her doctor that \"her diaphragm was inflamed\".       This part of the medical record was transcribed by Sujey Issa Scribquintin, from a dictation done by Jakub Sams MD.       PAST MEDICAL HISTORY  Past Medical History:   Diagnosis Date     Uncomplicated asthma      PAST SURGICAL HISTORY  History reviewed. No pertinent surgical history.  FAMILY HISTORY  No family history on file.  SOCIAL HISTORY  Social History   Substance Use Topics     Smoking status: Current Every Day Smoker     Packs/day: 0.25     Smokeless tobacco: Never Used     Alcohol use No      Comment: none in 2 years     MEDICATIONS  No current facility-administered medications " "for this encounter.      Current Outpatient Prescriptions   Medication     ALPRAZolam (XANAX PO)     CYCLOBENZAPRINE HCL PO     escitalopram (LEXAPRO) 20 MG tablet     HYDROcodone-acetaminophen (NORCO)  MG per tablet     LEVOTHYROXINE SODIUM PO     albuterol (PROAIR HFA/PROVENTIL HFA/VENTOLIN HFA) 108 (90 Base) MCG/ACT Inhaler     ipratropium - albuterol 0.5 mg/2.5 mg/3 mL (DUONEB) 0.5-2.5 (3) MG/3ML neb solution     ALLERGIES  Allergies   Allergen Reactions     No Clinical Screening - See Comments Hives     All Cillins     Sulfa Drugs Hives       I have reviewed the Medications, Allergies, Past Medical and Surgical History, and Social History in the Epic system.    Review of Systems   Constitutional: Negative for fever.   Respiratory: Positive for chest tightness. Negative for cough.    Cardiovascular: Negative for leg swelling.   Gastrointestinal: Positive for abdominal pain (epigastric).   Genitourinary: Negative for difficulty urinating and dysuria.   All other systems reviewed and are negative.      Physical Exam   BP: (!) 144/94  Pulse: 108  Temp: 98  F (36.7  C)  Resp: 16  Height: 170.2 cm (5' 7\")  Weight: 86.5 kg (190 lb 11.2 oz)  SpO2: 97 %      Physical Exam  General: awake, alert, NAD  Head: normal cephalic  HEENT: pupils equal, conjugate gaze in tact  Neck: Supple  CV: regular rate and rhythm without murmur  Lungs: clear to ascultation  Abd: soft, non-tender, no guarding, no peritoneal signs  EXT: lower extremities without swelling or edema  Neuro: awake, answers questions appropriately. No focal deficits noted     ED Course     ED Course     Procedures             EKG Interpretation:      Interpreted by Jakub Sams  Time reviewed: 22:25  Symptoms at time of EKG: epigastric pain  Rhythm: normal sinus   Rate: normal  Axis: normal  Ectopy: none  Conduction: normal  ST Segments/ T Waves: No ST-T wave changes  Q Waves: none  Comparison to prior: No old EKG available    Clinical Impression: normal " "EKG                 Labs Ordered and Resulted from Time of ED Arrival Up to the Time of Departure from the ED   COMPREHENSIVE METABOLIC PANEL - Abnormal; Notable for the following:        Result Value    Creatinine 0.50 (*)     All other components within normal limits   CBC WITH PLATELETS DIFFERENTIAL   LIPASE   TROPONIN I   HCG QUANTITATIVE PREGNANCY   CRP INFLAMMATION   PERIPHERAL IV CATHETER            Assessments & Plan (with Medical Decision Making)   Adele is a 32 year old female who presents with epigastric pain and belching. Belching sounds persistent since her surgery 6 months ago. Epigastric pain seems to be a worsening of a chronic pain. Reassuringly, she has a benign abdomen, stable vital signs, and nontoxic appearing. Patient does report she has had numerous outpatient evaluations including several EGDs and CT scans. Patient is here stating that she is frustrated with her previous provider and is sick of being told \"I don't know\" as to the cause of her ongoing symptoms.     Of note, patient does report that she had worsening of her chronic abdominal pain for the last week. Per chart review, I can see that she had a CT scan done,3 days prior to arrival however and and EGD 7 days PTA, I am not able to review the results as they were done at an outside clinic and not scanned into the [facility] record. Per the patient's report,  These were normal.    Basic labs were obtained. Reassuringly,  she has normal white count no left shift.  Normal CRP. She has normal electrolytes and normal lipase.  She also does state occasional pain radiating to her chest. Again, this sounds more chronic in nature. I did obtain an EKG which showed no signs of acute ischemia, and she had a negative troponin.  Given that her chief complaint is abdominal pain that is chronic in nature,  I do not think that she will need additional ACS evaluation for cardiac evaluation at this time since I have low suspicion that this is a cardiac " "etiology.     Patient had negative beta hCG ruling out ectopic pregnancy as a potential for the cause of abdominal pain.     At this point,  I went and reviewed with the patient I am not able to observe her eat CT results from previous hospitalization. I did offer to repeat CT scan today during her Emergency Department for evaluation given I cant confirm normal CT evaluation and she is endorsing new and worsening symptoms for the last week, so I do not think repeating CT scan would be unreasonable. However,  I did want to discuss risks and benefits,  as her lab evaluation so far has been normal, and CT does have a risk of radiation exposure.  At this point, patient became very upset and agitated requesting to be discharged. She stated I was making recommendations based on incomplete information as I had not \"seen inside her\".  Again, I reiterated that I could order a CT scan here given her abdominal complaints. However, the patient declined and asked to be discharged home. She is very frustrated that she drove 6 hours and feels that she is not getting the help that she needs.  I offered a Bariatric consultation as an outpatient referral, however given her unremarkable work up thus far and chronic nature of symptoms she does not meet admission criteria.       At this point, she has a benign abdominal exam, normal labs, despite a week of worsening symptoms, and stable vital signs, so I have a low suspicion that we are missing acute or life-threatening pathology. Also, per patient report she had a normal CT and EGD within this last week (CT following EGD, so do no think I am missing a perforated viscus or complication from EGD). Patient will be discharged from the Emergency Department without additional imaging per her request.  She will be given contact information for our Bariatric clinic and also our GI clinic if she would like to establish care for second opinion regarding her ongoing symptoms.  Patient's was " instructed to return if any new or worsening symptoms.    This part of the medical record was transcribed by Emmett Flood Medical Scribe, from a dictation done by Jakub Sams MD.       I have reviewed the nursing notes.    I have reviewed the findings, diagnosis, plan and need for follow up with the patient.    Discharge Medication List as of 6/27/2018 11:30 PM          Final diagnoses:   Abdominal pain, epigastric       6/27/2018   [facility], [place], EMERGENCY DEPARTMENT     Jakub Sams MD  06/28/18 [id number]

## 2018-06-29 RX ADMIN — HYDROCODONE BITARTRATE AND ACETAMINOPHEN PRN TAB: 10; 325 TABLET ORAL at 15:37

## 2018-06-29 NOTE — HP
ADMISSION DIAGNOSIS:  Mid epigastric abdominal pain, persistent belching.

 

HISTORY OF PRESENT ILLNESS:  Codi Piña is a direct admit.  She is a 32-year-old female,

who had Killian-en-Y gastric bypass surgery

 

 

DICTATION ENDS HERE

 

 

 

 

Adela Mack PA-C

DD:  06/29/2018 08:22:59

DT:  06/29/2018 13:04:30

Job #:  [id number]
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
## 2018-06-29 NOTE — CR
UGI w Small Bowel wo Air

 

 

 

CLINICAL HISTORY: Status post Killian-en-Y gastric bypass, abdominal pain

 

FINDINGS: Patient swallowed water-soluble contrast without difficulty. The esophagus had a normal con
tour. The gastric bypass shows no evidence of leakage or obstruction. The mid small bowel fills witho
ut significant distention or evidence of inflammatory change.

 

Small bowel follow-through shows normal transit through the small bowel to the ascending colon over a
pproximately 15 minutes.

 

 

Impression: Status post Killian-en-Y gastric bypass with no evidence of obstruction

## 2025-02-22 ENCOUNTER — HOSPITAL ENCOUNTER (EMERGENCY)
Dept: HOSPITAL 7 - FB.ED | Age: 40
Discharge: HOME | End: 2025-02-22
Payer: COMMERCIAL

## 2025-02-22 DIAGNOSIS — Z79.51: ICD-10-CM

## 2025-02-22 DIAGNOSIS — Z88.8: ICD-10-CM

## 2025-02-22 DIAGNOSIS — Z88.0: ICD-10-CM

## 2025-02-22 DIAGNOSIS — I10: ICD-10-CM

## 2025-02-22 DIAGNOSIS — Z88.2: ICD-10-CM

## 2025-02-22 DIAGNOSIS — Z79.899: ICD-10-CM

## 2025-02-22 DIAGNOSIS — Z02.89: Primary | ICD-10-CM
